# Patient Record
Sex: MALE | Race: BLACK OR AFRICAN AMERICAN | NOT HISPANIC OR LATINO | URBAN - METROPOLITAN AREA
[De-identification: names, ages, dates, MRNs, and addresses within clinical notes are randomized per-mention and may not be internally consistent; named-entity substitution may affect disease eponyms.]

---

## 2018-04-12 ENCOUNTER — OFFICE VISIT CONVERTED (OUTPATIENT)
Dept: SURGERY | Facility: CLINIC | Age: 23
End: 2018-04-12
Attending: SURGERY

## 2018-06-06 ENCOUNTER — CONVERSION ENCOUNTER (OUTPATIENT)
Dept: SURGERY | Facility: CLINIC | Age: 23
End: 2018-06-06

## 2018-06-06 ENCOUNTER — OFFICE VISIT CONVERTED (OUTPATIENT)
Dept: SURGERY | Facility: CLINIC | Age: 23
End: 2018-06-06
Attending: SURGERY

## 2018-06-21 ENCOUNTER — OFFICE VISIT CONVERTED (OUTPATIENT)
Dept: SURGERY | Facility: CLINIC | Age: 23
End: 2018-06-21
Attending: SURGERY

## 2018-06-21 ENCOUNTER — CONVERSION ENCOUNTER (OUTPATIENT)
Dept: SURGERY | Facility: CLINIC | Age: 23
End: 2018-06-21

## 2018-10-03 ENCOUNTER — OFFICE VISIT CONVERTED (OUTPATIENT)
Dept: SURGERY | Facility: CLINIC | Age: 23
End: 2018-10-03
Attending: SURGERY

## 2020-08-27 ENCOUNTER — OFFICE VISIT CONVERTED (OUTPATIENT)
Dept: NEUROLOGY | Facility: CLINIC | Age: 25
End: 2020-08-27
Attending: PSYCHIATRY & NEUROLOGY

## 2020-08-27 ENCOUNTER — CONVERSION ENCOUNTER (OUTPATIENT)
Dept: NEUROLOGY | Facility: CLINIC | Age: 25
End: 2020-08-27

## 2020-09-30 ENCOUNTER — HOSPITAL ENCOUNTER (OUTPATIENT)
Dept: NEUROLOGY | Facility: HOSPITAL | Age: 25
Discharge: HOME | End: 2020-09-30
Attending: PSYCHIATRY & NEUROLOGY

## 2020-10-16 ENCOUNTER — HOSPITAL ENCOUNTER (OUTPATIENT)
Dept: OTHER | Facility: HOSPITAL | Age: 25
Discharge: HOME | End: 2020-10-16
Attending: PSYCHIATRY & NEUROLOGY

## 2020-10-27 ENCOUNTER — OFFICE VISIT CONVERTED (OUTPATIENT)
Dept: NEUROLOGY | Facility: CLINIC | Age: 25
End: 2020-10-27
Attending: PSYCHIATRY & NEUROLOGY

## 2021-05-10 NOTE — H&P
History and Physical      Patient Name: Jonnathan Arevalo   Patient ID: [id number]   Sex: Male   YOB: 1995    Primary Care Provider: Nafisa Ryan MD   Referring Provider: Nafisa Ryan MD    Visit Date: 2020    Provider: Jamel Mendoza MD   Location: [facility]   Location Address: [address] [place] [address]  [id number]   Location Phone: [phone]          Chief Complaint     New pt here for seizure d/o.       History Of Present Illness  Jonnathan Arevalo is a 25 year old /Black male who presents today to [facility] today referred from Nafisa Ryan MD.      25-year-old man evaluated for seizures.  His father is with him today.  Patient has a learning disability.  He has been taking care of by his grandmother for his entire life until 3 years ago when his grandmother  and therefore his father and his uncle are taking care of him.  His mother sees him occasionally and she also has seizures.  The patient has been having seizures since he was 7 years old according to the father.  He has never been on any antiseizure medications in the past.  He seizures happens every day.  He stares in space every day and it can last for 1 to 2 minutes.  It can happen all day.  He had a spell in the last 2 weeks in which she would stare in space and then he would get stiff and his eyes would roll up and he does not fall down.  He gets tight for 5 minutes.  He gets confused with her 5 minutes thereafter.  According to father he has seen many of these spells and he starts staring and does not respond.  His father will usually try to nudge him to get his attention and finally he responds but gets confused if it does happen.    Father states that the patient needs to be coached for all activities of daily living or else he will not do it.  The need to tell him to brush his teeth, put the toothpaste in the toothbrush, change his clothes, dress and open  "with activities and supervise him all the time.  He is never left alone.  He went to high school in special ed.           Past Surgical History  Abdominal abscess drainage; Abdominal washout; Diagnostic laparoscopy         Allergy List  NO KNOWN DRUG ALLERGIES         Family Medical History  -Mother's Family History Unknown         Social History  Alcohol (Never); Caffeine (Current - status unknown); Second hand smoke exposure (Never); Tobacco (Never)         Review of Systems  · Constitutional  o Denies  o : chills, excessive sweating, fatigue, fever, sycope/passing out, weight gain, weight loss  · Eyes  o Denies  o : changes in vision, blurry vision, double vision  · HENT  o Admits  o : ear aches  o Denies  o : loss of hearing, ringing in the ears, sore throat, nasal congestion, sinus pain, nose bleeds, seasonal allergies  · Cardiovascular  o Denies  o : blood clots, swollen legs, anemia, easy burising or bleeding, transfusions  · Respiratory  o Denies  o : shortness of breath, dry cough, productive cough, pneumonia, COPD  · Gastrointestinal  o Denies  o : difficulty swallowing, reflux  · Genitourinary  o Denies  o : incontinence  · Neurologic  o Admits  o : headache, seizure  o Denies  o : stroke, tremor, loss of balance, falls, dizziness/vertigo, difficulty with sleep, numbness/tingling/paresthesia , difficulty with coordination, difficulty with dexterity, weakness  · Musculoskeletal  o Denies  o : neck stiffness/pain, swollen lymph nodes, muscle aches, joint pain, weakness, spasms, sciatica, pain radiating in arm, pain radiating in leg, low back pain  · Endocrine  o Denies  o : diabetes, thyroid disorder  · Psychiatric  o Denies  o : anxiety, depression      Vitals  Date Time BP Position Site L\R Cuff Size HR RR TEMP (F) WT  HT  BMI kg/m2 BSA m2 O2 Sat HC       08/27/2020 01:08 /72 Sitting    62 - R   294lbs 16oz 5'  11\" 41.14 2.59     08/27/2020 01:22 /72 Sitting    62 - R   294lbs 16oz 5'  11\" " 41.14 2.59           Physical Examination     He is alert, fluent, phasic, follows commands well.  Optic disks are normal bilaterally, EOMs full directions gaze, facial strength is full, soft palate elevation and tongue are normal.  There is no weakness of the upper or lower extremities individual muscle testing.  Fine finger movements are intact.  Reflexes are normoactive and symmetrical in the biceps, triceps, patellar's and ankles.  Cerebellar testing is intact.  On Station gait he is able to tiptoe, heel walk, alee and has slight difficulty with tandem.  Heart is regular rhythm normal in rate           Assessment  · Partial seizure with complex symptomatology     780.39/R56.9  I will start him on levetiracetam at 500 mg twice a day for 2 weeks and then 2 twice a day thereafter. Explained to them the adverse effects of the medication including drowsiness, and mood changes. Should have any problems they are to inform me. I will see him again in 2 months time for follow-up. I will do an MRI of the brain as well as EEG. I explained to the father that he has partial seizures. The differential is non-epileptic seizures however it is most likely partial seizures with complex symptomatology.    Minutes was spent for this low complexity encounter and more than half the time spent face-to-face the patient for examination, counseling, planning and recommendations.    Problems Reconciled  Plan  · Orders  o MRI brain wo then w contrast (53941) - 780.39/R56.9 - 2020   [facility]  o EEG-Awake and Asleep [facility] ([id number]) - 780.39/R56.9 - 2020  · Medications  o levetiracetam 500 mg oral tablet   SI tablet twice a day for 2 weeks then 2 tablets twice a day thereafter.   DISP: (120) tablets with 8 refills  Prescribed on 2020     o Medications have been Reconciled  o Transition of Care or Provider Policy  · Instructions  o Encouraged to follow-up with Primary Care Provider for preventative care.  o Follow up in  2 months.            Electronically Signed by: Jamel Mendoza MD -Author on August 27, 2020 01:48:37 PM

## 2021-05-13 NOTE — PROGRESS NOTES
Progress Note      Patient Name: Jonnathan Arevalo   Patient ID: [id number]   Sex: Male   YOB: 1995    Primary Care Provider: Nafisa Ryan MD   Referring Provider: Nafisa Ryan MD    Visit Date: October 27, 2020    Provider: Jamel Mendoza MD   Location: [facility]   Location Address: [address] [place] [address]  [id number]   Location Phone: [phone]          Chief Complaint     2 mo f/u seizures.       History Of Present Illness  Jonnathan Arevalo is a 25 year old /Black male who presents today to [facility] today referred from Nafisa Ryan MD.      25-year-old man here for follow-up of his seizures.  His uncle is with him.  He has not had any recurrent seizures since he is on levetiracetam 500 mg 2 tablets twice a day.  His uncle has noted him to have staring spells lasting for 5 minutes and is confused about 45 minutes in the past.  He has never had a generalized tonic-clonic seizure.  He had problems tolerating levetiracetam in the beginning but now is able to tolerate 40 mg 2 tablets twice a day.  Patient is not working.       Past Medical History  Seizure         Past Surgical History  Abdominal abscess drainage; Abdominal washout; Diagnostic laparoscopy         Medication List  cetirizine 10 mg oral tablet; levetiracetam 500 mg oral tablet; Omega DHA 92 mg (43 mg-22 [id number]) oral tablet,chewable; Prilosec oral         Allergy List  NO KNOWN DRUG ALLERGIES         Family Medical History  -Mother's Family History Unknown         Social History  Alcohol (Never); Caffeine (Current - status unknown); Second hand smoke exposure (Never); Tobacco (Never)         Review of Systems  · Constitutional  o Denies  o : chills, excessive sweating, fatigue, fever, sycope/passing out, weight gain, weight loss  · Eyes  o Denies  o : changes in vision, blurry vision, double vision  · HENT  o Denies  o : loss of hearing, ringing in  "the ears, ear aches, sore throat, nasal congestion, sinus pain, nose bleeds, seasonal allergies  · Cardiovascular  o Denies  o : blood clots, swollen legs, anemia, easy burising or bleeding, transfusions  · Respiratory  o Denies  o : shortness of breath, dry cough, productive cough, pneumonia, COPD  · Gastrointestinal  o Denies  o : difficulty swallowing, reflux  · Genitourinary  o Denies  o : incontinence  · Neurologic  o Admits  o : seizure  o Denies  o : headache, stroke, tremor, loss of balance, falls, dizziness/vertigo, difficulty with sleep, numbness/tingling/paresthesia , difficulty with coordination, difficulty with dexterity, weakness  · Musculoskeletal  o Denies  o : neck stiffness/pain, swollen lymph nodes, muscle aches, joint pain, weakness, spasms, sciatica, pain radiating in arm, pain radiating in leg, low back pain  · Endocrine  o Denies  o : diabetes, thyroid disorder  · Psychiatric  o Denies  o : anxiety, depression      Vitals  Date Time BP Position Site L\R Cuff Size HR RR TEMP (F) WT  HT  BMI kg/m2 BSA m2 O2 Sat FR L/min FiO2 HC       10/27/2020 03:48 /79 Sitting    70 - R   299lbs 0oz 5'  11\" 41.7 2.61             Physical Examination     Is alert, fluent, phasic and follows commands well.  Station and gait is unremarkable.  Heart is regular rhythm normal in rate.           Assessment  · Partial seizure with complex symptomatology     780.39/R56.9  He is to continue taking levetiracetam 500 mg 2 tablets twice a day. They are to call her office should he have recurrent seizures and I will add another antiepileptic medication to his regimen. I will see him again in 5 months time for follow-up.    15 minutes was spent for this low complexity visit more than half the time was spent face-to-face with the patient for examination, counseling, planning and recommendations.      Plan  · Medications  o Medications have been Reconciled  o Transition of Care or Provider " Policy  · Instructions  o Encouraged to follow-up with Primary Care Provider for preventative care.            Electronically Signed by: Jamel Mendoza MD -Author on October 27, 2020 04:02:42 PM

## 2021-05-14 VITALS
BODY MASS INDEX: 41.3 KG/M2 | SYSTOLIC BLOOD PRESSURE: 131 MMHG | DIASTOLIC BLOOD PRESSURE: 72 MMHG | HEART RATE: 62 BPM | WEIGHT: 295 LBS | HEIGHT: 71 IN

## 2021-05-14 VITALS
BODY MASS INDEX: 41.86 KG/M2 | HEART RATE: 70 BPM | HEIGHT: 71 IN | SYSTOLIC BLOOD PRESSURE: 132 MMHG | WEIGHT: 299 LBS | DIASTOLIC BLOOD PRESSURE: 79 MMHG

## 2021-05-16 VITALS
BODY MASS INDEX: 40.6 KG/M2 | RESPIRATION RATE: 16 BRPM | HEIGHT: 71 IN | WEIGHT: 290 LBS | BODY MASS INDEX: 40.6 KG/M2 | WEIGHT: 290 LBS | RESPIRATION RATE: 16 BRPM | HEIGHT: 71 IN

## 2021-05-16 VITALS — BODY MASS INDEX: 40.6 KG/M2 | WEIGHT: 290 LBS | RESPIRATION RATE: 18 BRPM | HEIGHT: 71 IN

## 2021-05-16 VITALS — BODY MASS INDEX: 40.6 KG/M2 | RESPIRATION RATE: 16 BRPM | WEIGHT: 290 LBS | HEIGHT: 71 IN

## 2021-05-16 VITALS — RESPIRATION RATE: 20 BRPM | WEIGHT: 289.31 LBS | HEIGHT: 71 IN | BODY MASS INDEX: 40.5 KG/M2

## 2021-08-09 ENCOUNTER — OFFICE VISIT (OUTPATIENT)
Dept: NEUROLOGY | Facility: CLINIC | Age: 26
End: 2021-08-09

## 2021-08-09 VITALS
DIASTOLIC BLOOD PRESSURE: 77 MMHG | SYSTOLIC BLOOD PRESSURE: 135 MMHG | HEIGHT: 70 IN | HEART RATE: 66 BPM | BODY MASS INDEX: 42.52 KG/M2 | WEIGHT: 297 LBS

## 2021-08-09 DIAGNOSIS — G40.109 LOCALIZATION-RELATED SYMPTOMATIC EPILEPSY AND EPILEPTIC SYNDROMES WITH SIMPLE PARTIAL SEIZURES, NOT INTRACTABLE, WITHOUT STATUS EPILEPTICUS (HCC): Primary | ICD-10-CM

## 2021-08-09 PROBLEM — R56.9 SEIZURE: Status: ACTIVE | Noted: 2021-08-09

## 2021-08-09 PROCEDURE — 99213 OFFICE O/P EST LOW 20 MIN: CPT | Performed by: PSYCHIATRY & NEUROLOGY

## 2021-08-09 RX ORDER — FAMOTIDINE 20 MG/1
20 TABLET, FILM COATED ORAL 2 TIMES DAILY
COMMUNITY
Start: 2021-07-27

## 2021-08-09 RX ORDER — OMEGA-3/DHA/EPA/FISH OIL 500-1000MG
CAPSULE ORAL
COMMUNITY

## 2021-08-09 RX ORDER — LEVETIRACETAM 500 MG/1
1000 TABLET ORAL 2 TIMES DAILY
Qty: 360 TABLET | Refills: 4 | Status: SHIPPED | OUTPATIENT
Start: 2021-08-09 | End: 2021-09-28 | Stop reason: SDUPTHER

## 2021-08-09 RX ORDER — LEVETIRACETAM 500 MG/1
1000 TABLET ORAL 2 TIMES DAILY
Qty: 360 TABLET | Refills: 4 | Status: SHIPPED | OUTPATIENT
Start: 2021-08-09 | End: 2021-08-09 | Stop reason: SDUPTHER

## 2021-08-09 RX ORDER — CETIRIZINE HYDROCHLORIDE 10 MG/1
TABLET ORAL
COMMUNITY

## 2021-08-09 RX ORDER — LEVETIRACETAM 500 MG/1
2 TABLET ORAL 2 TIMES DAILY
COMMUNITY
Start: 2021-07-01 | End: 2021-08-09 | Stop reason: SDUPTHER

## 2021-08-09 NOTE — PROGRESS NOTES
"Chief Complaint  Seizures    Subjective          Jonnathan Arevalo is a 26 y.o. male who presents to [facility]  History of Present Illness  26-year-old man here for follow-up of his seizures.  He is not having any seizures but has had 2 episodes and he felt like he was going to have a seizure and that was difficult for him to understand people and then it went away on its own.  He states that he has not had any staring spells and confusion.  His father is with him today.  He has not seen any spells.  He was supposed to take levetiracetam 500 mg 2 tablets twice a day however he is only taking it 2 tablets daily.  He has not had any recent laboratory work-up.    Objective   Vital Signs:   /77   Pulse 66   Ht 177.8 cm (70\")   Wt 135 kg (297 lb)   BMI 42.62 kg/m²     Physical Exam   He is alert, fluent, phasic, follows commands well.  Heart is regular rhythm normal in rate.  Station and gait is unremarkable.  He is able to tiptoe, heel walk, alee without difficulty.        Assessment and Plan  Diagnoses and all orders for this visit:    1. Localization-related symptomatic epilepsy and epileptic syndromes with simple partial seizures, not intractable, without status epilepticus (CMS/HCC) (Primary)  -     Comprehensive Metabolic Panel; Future  -     CBC & Differential; Future  -     Levetiracetam Level (Keppra); Future    Other orders  -     Discontinue: levETIRAcetam (KEPPRA) 500 MG tablet; Take 2 tablets by mouth 2 (two) times a day.  Dispense: 360 tablet; Refill: 4  -     levETIRAcetam (KEPPRA) 500 MG tablet; Take 2 tablets by mouth 2 (two) times a day.  Dispense: 360 tablet; Refill: 4         Total time spent with the patient and coordinating patient care was 20 minutes.    Follow Up  No follow-ups on file.  Patient was given instructions and counseling regarding his condition or for health maintenance advice. Please see specific information pulled into the AVS if " appropriate.

## 2021-08-16 ENCOUNTER — LAB (OUTPATIENT)
Dept: LAB | Facility: HOSPITAL | Age: 26
End: 2021-08-16

## 2021-08-16 DIAGNOSIS — G40.109 LOCALIZATION-RELATED SYMPTOMATIC EPILEPSY AND EPILEPTIC SYNDROMES WITH SIMPLE PARTIAL SEIZURES, NOT INTRACTABLE, WITHOUT STATUS EPILEPTICUS (HCC): ICD-10-CM

## 2021-08-16 LAB
ALBUMIN SERPL-MCNC: 4.3 G/DL (ref 3.5–5.2)
ALBUMIN/GLOB SERPL: 1.4 G/DL
ALP SERPL-CCNC: 113 U/L (ref 39–117)
ALT SERPL W P-5'-P-CCNC: 48 U/L (ref 1–41)
ANION GAP SERPL CALCULATED.3IONS-SCNC: 6.8 MMOL/L (ref 5–15)
AST SERPL-CCNC: 25 U/L (ref 1–40)
BASOPHILS # BLD AUTO: 0.03 10*3/MM3 (ref 0–0.2)
BASOPHILS NFR BLD AUTO: 0.5 % (ref 0–1.5)
BILIRUB SERPL-MCNC: 0.4 MG/DL (ref 0–1.2)
BUN SERPL-MCNC: 7 MG/DL (ref 6–20)
BUN/CREAT SERPL: 6.4 (ref 7–25)
CALCIUM SPEC-SCNC: 9.6 MG/DL (ref 8.6–10.5)
CHLORIDE SERPL-SCNC: 104 MMOL/L (ref 98–107)
CO2 SERPL-SCNC: 28.2 MMOL/L (ref 22–29)
CREAT SERPL-MCNC: 1.1 MG/DL (ref 0.76–1.27)
DEPRECATED RDW RBC AUTO: 37.8 FL (ref 37–54)
EOSINOPHIL # BLD AUTO: 0.17 10*3/MM3 (ref 0–0.4)
EOSINOPHIL NFR BLD AUTO: 2.6 % (ref 0.3–6.2)
ERYTHROCYTE [DISTWIDTH] IN BLOOD BY AUTOMATED COUNT: 12.2 % (ref 12.3–15.4)
GFR SERPL CREATININE-BSD FRML MDRD: 100 ML/MIN/1.73
GLOBULIN UR ELPH-MCNC: 3.1 GM/DL
GLUCOSE SERPL-MCNC: 123 MG/DL (ref 65–99)
HCT VFR BLD AUTO: 39.8 % (ref 37.5–51)
HGB BLD-MCNC: 13.9 G/DL (ref 13–17.7)
IMM GRANULOCYTES # BLD AUTO: 0.02 10*3/MM3 (ref 0–0.05)
IMM GRANULOCYTES NFR BLD AUTO: 0.3 % (ref 0–0.5)
LYMPHOCYTES # BLD AUTO: 1.92 10*3/MM3 (ref 0.7–3.1)
LYMPHOCYTES NFR BLD AUTO: 29.8 % (ref 19.6–45.3)
MCH RBC QN AUTO: 29.4 PG (ref 26.6–33)
MCHC RBC AUTO-ENTMCNC: 34.9 G/DL (ref 31.5–35.7)
MCV RBC AUTO: 84.3 FL (ref 79–97)
MONOCYTES # BLD AUTO: 0.44 10*3/MM3 (ref 0.1–0.9)
MONOCYTES NFR BLD AUTO: 6.8 % (ref 5–12)
NEUTROPHILS NFR BLD AUTO: 3.87 10*3/MM3 (ref 1.7–7)
NEUTROPHILS NFR BLD AUTO: 60 % (ref 42.7–76)
PLATELET # BLD AUTO: 246 10*3/MM3 (ref 140–450)
PMV BLD AUTO: 9.3 FL (ref 6–12)
POTASSIUM SERPL-SCNC: 4.7 MMOL/L (ref 3.5–5.2)
PROT SERPL-MCNC: 7.4 G/DL (ref 6–8.5)
RBC # BLD AUTO: 4.72 10*6/MM3 (ref 4.14–5.8)
SODIUM SERPL-SCNC: 139 MMOL/L (ref 136–145)
WBC # BLD AUTO: 6.45 10*3/MM3 (ref 3.4–10.8)

## 2021-08-16 PROCEDURE — 80053 COMPREHEN METABOLIC PANEL: CPT

## 2021-08-16 PROCEDURE — 36415 COLL VENOUS BLD VENIPUNCTURE: CPT

## 2021-08-16 PROCEDURE — 85025 COMPLETE CBC W/AUTO DIFF WBC: CPT

## 2021-08-16 PROCEDURE — 80177 DRUG SCRN QUAN LEVETIRACETAM: CPT

## 2021-08-19 LAB — LEVETIRACETAM SERPL-MCNC: 3.2 UG/ML (ref 10–40)

## 2021-08-20 ENCOUNTER — TELEPHONE (OUTPATIENT)
Dept: NEUROLOGY | Facility: CLINIC | Age: 26
End: 2021-08-20

## 2021-09-28 RX ORDER — LEVETIRACETAM 500 MG/1
1000 TABLET ORAL 2 TIMES DAILY
Qty: 360 TABLET | Refills: 8 | Status: SHIPPED | OUTPATIENT
Start: 2021-09-28 | End: 2022-05-09 | Stop reason: SDUPTHER

## 2021-09-28 NOTE — TELEPHONE ENCOUNTER
I was able to reach the patient and he said he had not been taking it right consistently. I explained the potential risk for breakthrough seizure that could result in permanent brain injury or death and he said he understood. I sent in refills to last until his f/u and asked that he call with further questions or concerns and he said he would.

## 2022-05-09 ENCOUNTER — OFFICE VISIT (OUTPATIENT)
Dept: NEUROLOGY | Facility: CLINIC | Age: 27
End: 2022-05-09

## 2022-05-09 ENCOUNTER — TELEPHONE (OUTPATIENT)
Dept: NEUROLOGY | Facility: CLINIC | Age: 27
End: 2022-05-09

## 2022-05-09 VITALS
HEIGHT: 70 IN | BODY MASS INDEX: 40.66 KG/M2 | SYSTOLIC BLOOD PRESSURE: 129 MMHG | WEIGHT: 284 LBS | HEART RATE: 71 BPM | DIASTOLIC BLOOD PRESSURE: 79 MMHG

## 2022-05-09 DIAGNOSIS — R56.9 SEIZURE: Primary | ICD-10-CM

## 2022-05-09 PROCEDURE — 99213 OFFICE O/P EST LOW 20 MIN: CPT | Performed by: PSYCHIATRY & NEUROLOGY

## 2022-05-09 RX ORDER — LEVETIRACETAM 500 MG/1
TABLET ORAL
Qty: 360 TABLET | Refills: 3 | Status: SHIPPED | OUTPATIENT
Start: 2022-05-09 | End: 2022-05-10 | Stop reason: SDUPTHER

## 2022-05-09 NOTE — TELEPHONE ENCOUNTER
I called the pharmacy to discuss and they advised me that the Keppra needing a PA was due to a quantity issue and they would pay for Keppra 100mg bid, but not 500mg tabs take 2 tabs bid as prescribed. They also advised me that the pt had last picked up this medication on 09/21/2022 for 90 day supply and that was the first and only time that they had filled it for him. After speaking with Dr. Mendoza, the decision was made to send in the prescription for the Keppra 1000mg bid dose for a 1 year supply as was prescribed at his appointment yesterday on 05/09/2022.

## 2022-05-09 NOTE — TELEPHONE ENCOUNTER
Caller: [facility]    Relationship:     Best call back number: [phone]    What medications are you currently taking:   Current Outpatient Medications on File Prior to Visit   Medication Sig Dispense Refill   • cetirizine (zyrTEC) 10 MG tablet cetirizine 10 mg oral tablet take 1 tablet (10 mg) by oral route once daily   Active     • famotidine (PEPCID) 20 MG tablet Take 20 mg by mouth 2 (Two) Times a Day.     • levETIRAcetam (KEPPRA) 500 MG tablet Take 2 tablets twice a day. 360 tablet 3   • Omega-3 Fatty Acids (Omega 3 500) 500 MG capsule Omega DHA 92 mg (43 mg-22 [id number]) oral tablet,chewable 500 mg qd   Active     • [DISCONTINUED] levETIRAcetam (KEPPRA) 500 MG tablet Take 2 tablets by mouth 2 (two) times a day. 360 tablet 8     No current facility-administered medications on file prior to visit.          When did you start taking these medications: N/A    Which medication are you concerned about: LEVETIRACECHICHO    Who prescribed you this medication: DR. REZA    What are your concerns: NEEDS PA    PLEASE CONTACT PHARMACY WITH PA    THANK YOU    How long have you had these concerns: N/A

## 2022-05-09 NOTE — PROGRESS NOTES
"Chief Complaint  Seizures    Subjective          Jonnathan Arevalo is a 26 y.o. male who presents to [facility]  History of Present Illness  26-year-old man here for follow-up with his seizures.  His uncle is with him today.  Patient has had partial seizures in which she stares.  EEG was abnormal showing frequent left frontal epileptiform discharges.  MRI of the brain is unremarkable.  Is taking levetiracetam 500 mg 2 tablets twice a day.  He has not had any recurrent staring spells.    Objective   Vital Signs:   /79 (BP Location: Right arm, Patient Position: Sitting, Cuff Size: Adult)   Pulse 71   Ht 177.8 cm (70\")   Wt 129 kg (284 lb)   BMI 40.75 kg/m²     Physical Exam   Alert, fluent, phasic, follows commands well.  Heart is regular rhythm normal in rate.  Station gait is unremarkable.        Assessment and Plan  Diagnoses and all orders for this visit:    1. Seizure (HCC) (Primary)  Assessment & Plan:  He has focal seizure with alteration of consciousness.  He is again taking levetiracetam 500 mg 2 tablets twice a day.  He is to follow-up with his primary care provider to prescribe medications in the future.  Should he have frequent staring spells and recurrent seizures in spite of being compliant with medications they are to make a follow-up visit to see me and I will start him on Vimpat.         Total time spent with the patient and coordinating patient care was 25 minutes.    Follow Up  No follow-ups on file.  Patient was given instructions and counseling regarding his condition or for health maintenance advice. Please see specific information pulled into the AVS if appropriate.       "

## 2022-05-09 NOTE — ASSESSMENT & PLAN NOTE
He has focal seizure with alteration of consciousness.  He is again taking levetiracetam 500 mg 2 tablets twice a day.  He is to follow-up with his primary care provider to prescribe medications in the future.  Should he have frequent staring spells and recurrent seizures in spite of being compliant with medications they are to make a follow-up visit to see me and I will start him on Vimpat.

## 2022-05-10 RX ORDER — LEVETIRACETAM 1000 MG/1
1000 TABLET ORAL 2 TIMES DAILY
Qty: 180 TABLET | Refills: 3 | Status: SHIPPED | OUTPATIENT
Start: 2022-05-10

## 2022-05-10 NOTE — TELEPHONE ENCOUNTER
I spoke to the pt and notified him of the Keppra 1000mg bid prescription and he voiced understanding and had no further questions at this time and will call with further questions or concerns,.

## 2023-06-12 RX ORDER — LEVETIRACETAM 1000 MG/1
1000 TABLET ORAL 2 TIMES DAILY
Qty: 180 TABLET | Refills: 3 | OUTPATIENT
Start: 2023-06-12

## 2023-08-23 ENCOUNTER — OFFICE VISIT (OUTPATIENT)
Dept: NEUROLOGY | Facility: CLINIC | Age: 28
End: 2023-08-23
Payer: COMMERCIAL

## 2023-08-23 VITALS
BODY MASS INDEX: 42.66 KG/M2 | SYSTOLIC BLOOD PRESSURE: 139 MMHG | DIASTOLIC BLOOD PRESSURE: 85 MMHG | WEIGHT: 298 LBS | HEIGHT: 70 IN | HEART RATE: 83 BPM

## 2023-08-23 DIAGNOSIS — G40.109 LOCALIZATION-RELATED SYMPTOMATIC EPILEPSY AND EPILEPTIC SYNDROMES WITH SIMPLE PARTIAL SEIZURES, NOT INTRACTABLE, WITHOUT STATUS EPILEPTICUS: ICD-10-CM

## 2023-08-23 DIAGNOSIS — R56.9 SEIZURE: Primary | ICD-10-CM

## 2023-08-23 PROCEDURE — 1159F MED LIST DOCD IN RCRD: CPT | Performed by: PSYCHIATRY & NEUROLOGY

## 2023-08-23 PROCEDURE — 99213 OFFICE O/P EST LOW 20 MIN: CPT | Performed by: PSYCHIATRY & NEUROLOGY

## 2023-08-23 PROCEDURE — 1160F RVW MEDS BY RX/DR IN RCRD: CPT | Performed by: PSYCHIATRY & NEUROLOGY

## 2023-08-23 RX ORDER — LEVETIRACETAM 1000 MG/1
1000 TABLET ORAL 2 TIMES DAILY
Qty: 180 TABLET | Refills: 4 | Status: SHIPPED | OUTPATIENT
Start: 2023-08-23

## 2023-08-23 NOTE — PROGRESS NOTES
"Chief Complaint  Seizures (PCP request our office to continue to follow for medication refills, patient has been out of medication for 2 days.)    Subjective          Jonnathan Arevalo is a 28 y.o. male who presents to [facility]  History of Present Illness  28-year-old man follow-up for seizures.  Has not had any seizures in the last 2 days in which she had staring spells at least 2 or 3 times.  His uncle is with him.  He lives with his uncle.  He stares in space.  His uncle states that the when he is home in the past is that he was in a store waiting and lying and he did not move and when he realized that he was standing in line his arms were down.  His uncle states that he just stares in space for a few seconds.  He is taking levetiracetam 1000 mg twice a day.  I had him follow-up with his primary care however they tell me that the primary care wanted him to follow-up with me for his medications.  He has not had recent laboratory work-up.    He lives with his uncle.  He is not driving.  He is independent with activities of daily living.  He does not work with machinery.  He is taking care of his uncle who has a handicap and is unable to walk but is able to drive using hand controls as well as cane to drive.  Objective   Vital Signs:   /85   Pulse 83   Ht 177.8 cm (70\")   Wt 135 kg (298 lb)   BMI 42.76 kg/mý     Physical Exam   Alert, fluent, phasic, follows commands well.  Optic disc are normal bilaterally there is nystagmus noted that is lateral.  There is no focal weakness.  Heart is regular rhythm normal in rate.        Assessment and Plan  Diagnoses and all orders for this visit:    1. Seizure (Primary)    2. Localization-related symptomatic epilepsy and epileptic syndromes with simple partial seizures, not intractable, without status epilepticus  Assessment & Plan:  He is to continue taking levetiracetam at 1000 mg twice a day.  I will obtain laboratory " work-up and I will inform him regarding the results.  I will see him again in 1 years time for follow-up.    Orders:  -     CBC & Differential; Future  -     Comprehensive Metabolic Panel; Future  -     Lipid Panel; Future  -     Hemoglobin A1c; Future  -     TSH; Future    Other orders  -     levETIRAcetam (KEPPRA) 1000 MG tablet; Take 1 tablet by mouth 2 (Two) Times a Day.  Dispense: 180 tablet; Refill: 4         Total time spent with the patient and coordinating patient care was 25 minutes.    Follow Up  No follow-ups on file.  Patient was given instructions and counseling regarding his condition or for health maintenance advice. Please see specific information pulled into the AVS if appropriate.

## 2023-08-23 NOTE — ASSESSMENT & PLAN NOTE
He is to continue taking levetiracetam at 1000 mg twice a day.  I will obtain laboratory work-up and I will inform him regarding the results.  I will see him again in 1 years time for follow-up.

## 2024-02-08 ENCOUNTER — TELEPHONE (OUTPATIENT)
Dept: NEUROLOGY | Facility: CLINIC | Age: 29
End: 2024-02-08
Payer: COMMERCIAL

## 2024-02-08 RX ORDER — LEVETIRACETAM 750 MG/1
1500 TABLET ORAL 2 TIMES DAILY
Qty: 120 TABLET | Refills: 5 | Status: SHIPPED | OUTPATIENT
Start: 2024-02-08 | End: 2024-03-09

## 2024-02-08 NOTE — TELEPHONE ENCOUNTER
"Spoke with pt on the phone states he takes his Keppra once in the am and once in the pm. States he has not missed any doses. States before starting medication it would take him about 10 minutes to \"come out of it\" but now takes 2-3 minutes. States just stares during episode. States happens about 3 times per day and this has been occurring for the past couple of weeks. States last episode was about 2 hours ago.   "

## 2024-02-08 NOTE — TELEPHONE ENCOUNTER
Provider: DR REZA    Caller: PATIENT    Relationship to Patient: SELF    Pharmacy: [facility]    Phone Number: [phone]    Reason for Call: PATIENT STATES HE FEELS LIKE THE MEDICATION IS NOT WORKING AS WELL AS IT SHOULD BE. HE IS STILL HAVING SEIZURES DAILY BUT STATES IT IS EASIER THAN BEFORE TO SNAP OUT OF THEM. PLEASE REVIEW & ADVISE, THANK YOU.

## 2024-02-08 NOTE — TELEPHONE ENCOUNTER
Spoke with pt on the phone stated he does not drive but someone can  medicine for him. Let him know Sonya sent in new prescription for him to take as directed. Increased to 1500mg BID. Pt verbalized understanding.

## 2024-02-08 NOTE — TELEPHONE ENCOUNTER
Increase Keppra to 1500mg BID. I have sent into pharmacy.  He is not cleared to drive until 90 days seizure free.  He should pursue sooner f/u with [facility]

## 2024-02-16 ENCOUNTER — LAB (OUTPATIENT)
Dept: LAB | Facility: HOSPITAL | Age: 29
End: 2024-02-16
Payer: COMMERCIAL

## 2024-02-16 DIAGNOSIS — G40.109 LOCALIZATION-RELATED SYMPTOMATIC EPILEPSY AND EPILEPTIC SYNDROMES WITH SIMPLE PARTIAL SEIZURES, NOT INTRACTABLE, WITHOUT STATUS EPILEPTICUS: ICD-10-CM

## 2024-02-16 LAB
ALBUMIN SERPL-MCNC: 4.4 G/DL (ref 3.5–5.2)
ALBUMIN/GLOB SERPL: 1.7 G/DL
ALP SERPL-CCNC: 115 U/L (ref 39–117)
ALT SERPL W P-5'-P-CCNC: 45 U/L (ref 1–41)
ANION GAP SERPL CALCULATED.3IONS-SCNC: 10.6 MMOL/L (ref 5–15)
AST SERPL-CCNC: 32 U/L (ref 1–40)
BASOPHILS # BLD AUTO: 0.03 10*3/MM3 (ref 0–0.2)
BASOPHILS NFR BLD AUTO: 0.5 % (ref 0–1.5)
BILIRUB SERPL-MCNC: 0.3 MG/DL (ref 0–1.2)
BUN SERPL-MCNC: 12 MG/DL (ref 6–20)
BUN/CREAT SERPL: 11.8 (ref 7–25)
CALCIUM SPEC-SCNC: 9.1 MG/DL (ref 8.6–10.5)
CHLORIDE SERPL-SCNC: 105 MMOL/L (ref 98–107)
CHOLEST SERPL-MCNC: 129 MG/DL (ref 0–200)
CO2 SERPL-SCNC: 25.4 MMOL/L (ref 22–29)
CREAT SERPL-MCNC: 1.02 MG/DL (ref 0.76–1.27)
DEPRECATED RDW RBC AUTO: 40.1 FL (ref 37–54)
EGFRCR SERPLBLD CKD-EPI 2021: 104 ML/MIN/1.73
EOSINOPHIL # BLD AUTO: 0.14 10*3/MM3 (ref 0–0.4)
EOSINOPHIL NFR BLD AUTO: 2.2 % (ref 0.3–6.2)
ERYTHROCYTE [DISTWIDTH] IN BLOOD BY AUTOMATED COUNT: 12.5 % (ref 12.3–15.4)
GLOBULIN UR ELPH-MCNC: 2.6 GM/DL
GLUCOSE SERPL-MCNC: 132 MG/DL (ref 65–99)
HBA1C MFR BLD: 5.9 % (ref 4.8–5.6)
HCT VFR BLD AUTO: 43.6 % (ref 37.5–51)
HDLC SERPL-MCNC: 46 MG/DL (ref 40–60)
HGB BLD-MCNC: 14.6 G/DL (ref 13–17.7)
IMM GRANULOCYTES # BLD AUTO: 0.03 10*3/MM3 (ref 0–0.05)
IMM GRANULOCYTES NFR BLD AUTO: 0.5 % (ref 0–0.5)
LDLC SERPL CALC-MCNC: 67 MG/DL (ref 0–100)
LDLC/HDLC SERPL: 1.46 {RATIO}
LYMPHOCYTES # BLD AUTO: 1.69 10*3/MM3 (ref 0.7–3.1)
LYMPHOCYTES NFR BLD AUTO: 26.8 % (ref 19.6–45.3)
MCH RBC QN AUTO: 29.5 PG (ref 26.6–33)
MCHC RBC AUTO-ENTMCNC: 33.5 G/DL (ref 31.5–35.7)
MCV RBC AUTO: 88.1 FL (ref 79–97)
MONOCYTES # BLD AUTO: 0.44 10*3/MM3 (ref 0.1–0.9)
MONOCYTES NFR BLD AUTO: 7 % (ref 5–12)
NEUTROPHILS NFR BLD AUTO: 3.97 10*3/MM3 (ref 1.7–7)
NEUTROPHILS NFR BLD AUTO: 63 % (ref 42.7–76)
PLATELET # BLD AUTO: 201 10*3/MM3 (ref 140–450)
PMV BLD AUTO: 10.9 FL (ref 6–12)
POTASSIUM SERPL-SCNC: 4.6 MMOL/L (ref 3.5–5.2)
PROT SERPL-MCNC: 7 G/DL (ref 6–8.5)
RBC # BLD AUTO: 4.95 10*6/MM3 (ref 4.14–5.8)
SODIUM SERPL-SCNC: 141 MMOL/L (ref 136–145)
TRIGL SERPL-MCNC: 80 MG/DL (ref 0–150)
TSH SERPL DL<=0.05 MIU/L-ACNC: 0.89 UIU/ML (ref 0.27–4.2)
VLDLC SERPL-MCNC: 16 MG/DL (ref 5–40)
WBC NRBC COR # BLD AUTO: 6.3 10*3/MM3 (ref 3.4–10.8)

## 2024-02-16 PROCEDURE — 83036 HEMOGLOBIN GLYCOSYLATED A1C: CPT

## 2024-02-16 PROCEDURE — 80061 LIPID PANEL: CPT

## 2024-02-16 PROCEDURE — 80053 COMPREHEN METABOLIC PANEL: CPT

## 2024-02-16 PROCEDURE — 84443 ASSAY THYROID STIM HORMONE: CPT

## 2024-02-16 PROCEDURE — 85025 COMPLETE CBC W/AUTO DIFF WBC: CPT

## 2024-02-16 PROCEDURE — 36415 COLL VENOUS BLD VENIPUNCTURE: CPT

## 2024-08-22 ENCOUNTER — OFFICE VISIT (OUTPATIENT)
Dept: NEUROLOGY | Facility: CLINIC | Age: 29
End: 2024-08-22
Payer: COMMERCIAL

## 2024-08-22 ENCOUNTER — TELEPHONE (OUTPATIENT)
Dept: NEUROLOGY | Facility: CLINIC | Age: 29
End: 2024-08-22

## 2024-08-22 VITALS
BODY MASS INDEX: 39.65 KG/M2 | SYSTOLIC BLOOD PRESSURE: 139 MMHG | WEIGHT: 277 LBS | DIASTOLIC BLOOD PRESSURE: 83 MMHG | HEART RATE: 69 BPM | HEIGHT: 70 IN

## 2024-08-22 DIAGNOSIS — G40.109 LOCALIZATION-RELATED SYMPTOMATIC EPILEPSY AND EPILEPTIC SYNDROMES WITH SIMPLE PARTIAL SEIZURES, NOT INTRACTABLE, WITHOUT STATUS EPILEPTICUS: Primary | ICD-10-CM

## 2024-08-22 PROCEDURE — 1160F RVW MEDS BY RX/DR IN RCRD: CPT | Performed by: PSYCHIATRY & NEUROLOGY

## 2024-08-22 PROCEDURE — 1159F MED LIST DOCD IN RCRD: CPT | Performed by: PSYCHIATRY & NEUROLOGY

## 2024-08-22 PROCEDURE — 99213 OFFICE O/P EST LOW 20 MIN: CPT | Performed by: PSYCHIATRY & NEUROLOGY

## 2024-08-22 RX ORDER — LACOSAMIDE 150 MG/1
TABLET ORAL
Qty: 60 TABLET | Refills: 5 | Status: SHIPPED | OUTPATIENT
Start: 2024-08-22

## 2024-08-22 RX ORDER — LACOSAMIDE 50 MG/1
TABLET ORAL
Qty: 14 TABLET | Refills: 0 | Status: SHIPPED | OUTPATIENT
Start: 2024-08-22

## 2024-08-22 RX ORDER — LACOSAMIDE 100 MG/1
TABLET ORAL
Qty: 14 TABLET | Refills: 0 | Status: SHIPPED | OUTPATIENT
Start: 2024-08-22

## 2024-08-22 NOTE — PROGRESS NOTES
"Chief Complaint  Med Refill (Keppra) and Follow-up (Reports he is still having frequent seizures. )    Subjective          Jonnathan Arevalo is a 29 y.o. male who presents to [facility]  History of Present Illness  29-year-old man here for follow-up of his seizures.  He states that he is having staring spells at least 2 or 3 times a month lasting for 2 seconds at a time which is a lot better than before which could last a few seconds to a minute or longer he realizes that he is out of it.  Is taking Keppra 750 mg 2 tablets twice a day.  He has no history of heart disease.    Objective   Vital Signs:   /83   Pulse 69   Ht 177.8 cm (70\")   Wt 126 kg (277 lb)   BMI 39.75 kg/m²     Physical Exam   Alert, fluent, phasic, follows commands well.  Heart is regular rhythm normal in rate        Assessment and Plan  Diagnoses and all orders for this visit:    1. Localization-related symptomatic epilepsy and epileptic syndromes with simple partial seizures, not intractable, without status epilepticus (Primary)  Assessment & Plan:  I gave him a titration schedule of Vimpat which she is to take 50 mg twice a day for 1 week, 100 mg twice a day the second week, 150 mg twice a day the third week and on.  He is to continue taking Keppra 750 mg 2 tablets twice a day.  I explained to him that if his effects of Vimpat and he signed a controlled substance agreement and I will see him again in 2 months time for follow-up.  Thank you for let me participate in his care.    Orders:  -     lacosamide (VIMPAT) 50 MG tablet tablet; 1 tablet twice a day first week  Dispense: 14 tablet; Refill: 0  -     lacosamide (VIMPAT) 100 MG tablet tablet; 1 tablet twice a day second week  Dispense: 14 tablet; Refill: 0  -     lacosamide 150 MG tablet; 1 tablet twice a day starting on the third week and on.  Dispense: 60 tablet; Refill: 5         Total time spent with the patient and coordinating patient care " was 20 minutes.    Follow Up  No follow-ups on file.  Patient was given instructions and counseling regarding his condition or for health maintenance advice. Please see specific information pulled into the AVS if appropriate.

## 2024-08-22 NOTE — ASSESSMENT & PLAN NOTE
I gave him a titration schedule of Vimpat which she is to take 50 mg twice a day for 1 week, 100 mg twice a day the second week, 150 mg twice a day the third week and on.  He is to continue taking Keppra 750 mg 2 tablets twice a day.  I explained to him that if his effects of Vimpat and he signed a controlled substance agreement and I will see him again in 2 months time for follow-up.  Thank you for let me participate in his care.

## 2024-08-22 NOTE — TELEPHONE ENCOUNTER
Provider: LIBIA    Caller: [facility]    Pharmacy: [facility]    Phone Number: [phone]    Reason for Call: ELIAZAR WITH [facility] CALLED AND STATES THAT HEY RECEIVED 3 PRESCRIPTIONS FOR LACOSAMIDE WITH DIFFERENT MG'S. PHARMACY IS WANTING TO KNOW IF A NEW PRESCRIPTION FOR THE 50MG CAN BE SENT OVER FOR 1 TABLET 2 TIMES A DAY FOR 7 DAYS, THEN 2 TABLETS 2 TIMES A DAY FOR 7 DAYS AND SO ON UNTIL PT IS AT 150MG. PHARMACY IS WANTING TO CANCEL OUT THE 100MG OF MEDICATION AND GO TO THE 15OMG TAPERING UP WITH THE 50 MG OF MEDICATION.    PLEASE REVIEW AND ADVISE  THANK YOU

## 2024-08-23 NOTE — TELEPHONE ENCOUNTER
ELIAZAR FROM [facility] CALLED AND WAS SUCCESSFULLY WARM TRANSFERRED TO [facility]    DOC PER [facility]UB PROTOCOL

## 2024-08-23 NOTE — TELEPHONE ENCOUNTER
Spoke with Gisselle and relayed the message regarding the medication dose and quantity, but she would till like to speak with Spike to ask additional questions.

## 2024-08-23 NOTE — TELEPHONE ENCOUNTER
Pharmacy will need to be called once they open, [employer] does not typically pay for that quantity & medication is prescribed in that way for best practice so dosing is not administered incorrectly by patient.

## 2024-08-26 RX ORDER — LEVETIRACETAM 750 MG/1
1500 TABLET ORAL 2 TIMES DAILY
Qty: 120 TABLET | Refills: 4 | Status: SHIPPED | OUTPATIENT
Start: 2024-08-26 | End: 2024-09-25

## 2024-08-26 NOTE — TELEPHONE ENCOUNTER
I have called twice, second time today, and have been on hold over 10 minutes. Our nurse has also called with lengthy wait times. I called patient & he stated he was able to  Vimpat with no problems. Asked to call back if he has any issues.

## 2024-10-11 ENCOUNTER — TELEPHONE (OUTPATIENT)
Dept: NEUROLOGY | Facility: CLINIC | Age: 29
End: 2024-10-11
Payer: COMMERCIAL

## 2024-10-11 DIAGNOSIS — G40.109 LOCALIZATION-RELATED SYMPTOMATIC EPILEPSY AND EPILEPTIC SYNDROMES WITH SIMPLE PARTIAL SEIZURES, NOT INTRACTABLE, WITHOUT STATUS EPILEPTICUS: ICD-10-CM

## 2024-10-11 RX ORDER — LACOSAMIDE 100 MG/1
TABLET ORAL
Qty: 60 TABLET | Refills: 3 | Status: SHIPPED | OUTPATIENT
Start: 2024-10-11

## 2024-10-11 NOTE — TELEPHONE ENCOUNTER
MEDICATION CONCERNS    Caller: Jonnathan Arevalo SIL    Relationship: Self    Best call back number: [phone]    What medications are you currently taking:   Current Outpatient Medications on File Prior to Visit   Medication Sig Dispense Refill    cetirizine (zyrTEC) 10 MG tablet cetirizine 10 mg oral tablet take 1 tablet (10 mg) by oral route once daily   Active      famotidine (PEPCID) 20 MG tablet Take 1 tablet by mouth 2 (Two) Times a Day.      lacosamide (VIMPAT) 100 MG tablet tablet 1 tablet twice a day second week 14 tablet 0    lacosamide (VIMPAT) 50 MG tablet tablet 1 tablet twice a day first week 14 tablet 0    lacosamide 150 MG tablet 1 tablet twice a day starting on the third week and on. 60 tablet 5    levETIRAcetam (KEPPRA) 750 MG tablet TAKE 2 TABLETS BY MOUTH 2 (TWO) TIMES A DAY FOR 30 DAYS. 120 tablet 4    Omega-3 Fatty Acids (Omega 3 500) 500 MG capsule Omega DHA 92 mg (43 mg-22 [id number]) oral tablet,chewable 500 mg qd   Active       No current facility-administered medications on file prior to visit.     Which medication are you concerned about: acosamide (VIMPAT)    Who prescribed you this medication: DR. REZA    When did you start taking these medications: 8/27/2024 (THE DAY AFTER RX WAS SENT IN)    What are your concerns: PT STATES HE TOOK TWO DOSAGES OF THE VIMPAT WHEN RX WAS ORIGINALLY PRESCRIBED, WITH NO CONCERN. HOWEVER, WHEN HE TOOK THE THIRD DOSAGE, HE REPORTS HE BECAME VERY LIGHTHEADED AND DIZZY. HE STATES HE THEN DECIDED TO DISCONTINUE THE MEDICATION AND HAS NOT TAKEN IT SINCE.    HE DENIES ANY RECENT SEIZURES/SEIZURE-LIKE ACTIVITY. HE CONTINUES TO TAKE KEPPRA 750MG- TWO TABLETS TWICE DAILY.    ATTEMPTED TO WARM-TRANSFER CALL TO CLINICAL STAFF TO FURTHER DISCUSSION. NO ANSWER. PT IS AWARE TO REPORT TO THE NEAREST ED FOR FURTHER EVALUATION IF HE HAS ANY SEIZURES/SEIZURE-LIKE ACTIVITY WHILE AWAITING A CALL BACK. PT VERBALIZED UNDERSTANDING.    PLEASE REVIEW AND ADVISE.

## 2024-10-11 NOTE — TELEPHONE ENCOUNTER
"Spoke with patient, he stated that when he started the 150 mg is when the symptoms started- extreme dizziness, constant lightheadedness and nausea, reports he was taking medication with food. States he felt fine on the 100 mg, he stopped the vimpat \"3 or so\" days ago. Reports he is not currently having any seizures and is taking his keppra.  "

## 2024-10-11 NOTE — TELEPHONE ENCOUNTER
Called patient and let him know Sonya will be sending 100 mg Vimpat to his pharmacy this evening. Spoke with pharmacy and they will notify patient when script is filled.

## 2024-10-23 ENCOUNTER — OFFICE VISIT (OUTPATIENT)
Dept: NEUROLOGY | Facility: CLINIC | Age: 29
End: 2024-10-23
Payer: COMMERCIAL

## 2024-10-23 VITALS
HEART RATE: 68 BPM | WEIGHT: 272 LBS | SYSTOLIC BLOOD PRESSURE: 135 MMHG | BODY MASS INDEX: 38.94 KG/M2 | DIASTOLIC BLOOD PRESSURE: 74 MMHG | HEIGHT: 70 IN

## 2024-10-23 DIAGNOSIS — G40.109 LOCALIZATION-RELATED SYMPTOMATIC EPILEPSY AND EPILEPTIC SYNDROMES WITH SIMPLE PARTIAL SEIZURES, NOT INTRACTABLE, WITHOUT STATUS EPILEPTICUS: Primary | ICD-10-CM

## 2024-10-23 PROCEDURE — 1160F RVW MEDS BY RX/DR IN RCRD: CPT | Performed by: PSYCHIATRY & NEUROLOGY

## 2024-10-23 PROCEDURE — 1159F MED LIST DOCD IN RCRD: CPT | Performed by: PSYCHIATRY & NEUROLOGY

## 2024-10-23 PROCEDURE — 99213 OFFICE O/P EST LOW 20 MIN: CPT | Performed by: PSYCHIATRY & NEUROLOGY

## 2024-10-23 RX ORDER — LEVETIRACETAM 750 MG/1
TABLET ORAL
Qty: 360 TABLET | Refills: 3 | Status: SHIPPED | OUTPATIENT
Start: 2024-10-23

## 2024-10-23 NOTE — ASSESSMENT & PLAN NOTE
His grandfather wants him to continue taking Keppra and does not want him to start another medication such as Xcopri.  I discussed with them that should his seizures become intolerable that keeps on having staring spells I would recommend for him to be started on Xcopri.  I will see him again in 4 months time for follow-up.  Thank you

## 2024-10-23 NOTE — PROGRESS NOTES
"Chief Complaint  Follow-up (Unable to tolerate vimpat. )    Subjective          Jonnathan Arevalo is a 29 y.o. male who presents to [facility]  History of Present Illness  29-year-old man here for eval of his seizures.  His grandfather is with him.  His grandfather states that he has a seizure in which she stares for a few minutes every so often.  Tried to adjust the Vimpat and Keppra in which she stopped taking the Keppra and continue taking the Vimpat at lower doses and it still made him very dizzy.  He could not tolerate the Vimpat.  He is taking Keppra 750 mg 2 tablets twice a day.    Objective   Vital Signs:   /74 (BP Location: Right arm, Patient Position: Sitting, Cuff Size: Adult)   Pulse 68   Ht 177.8 cm (70\")   Wt 123 kg (272 lb)   BMI 39.03 kg/m²     Physical Exam   Alert, fluent, phasic, follows commands well.  Heart is regular rhythm normal in rate        Assessment and Plan  Diagnoses and all orders for this visit:    1. Localization-related symptomatic epilepsy and epileptic syndromes with simple partial seizures, not intractable, without status epilepticus (Primary)  Assessment & Plan:  His grandfather wants him to continue taking Keppra and does not want him to start another medication such as Xcopri.  I discussed with them that should his seizures become intolerable that keeps on having staring spells I would recommend for him to be started on Xcopri.  I will see him again in 4 months time for follow-up.  Thank you      Other orders  -     levETIRAcetam (KEPPRA) 750 MG tablet; Take 2 tablets twice a day  Dispense: 360 tablet; Refill: 3         Total time spent with the patient and coordinating patient care was 25 minutes.    Follow Up  No follow-ups on file.  Patient was given instructions and counseling regarding his condition or for health maintenance advice. Please see specific information pulled into the AVS if appropriate.       "

## 2025-03-24 ENCOUNTER — OFFICE VISIT (OUTPATIENT)
Dept: NEUROLOGY | Facility: CLINIC | Age: 30
End: 2025-03-24
Payer: COMMERCIAL

## 2025-03-24 VITALS
HEART RATE: 68 BPM | DIASTOLIC BLOOD PRESSURE: 72 MMHG | WEIGHT: 263 LBS | SYSTOLIC BLOOD PRESSURE: 114 MMHG | HEIGHT: 70 IN | BODY MASS INDEX: 37.65 KG/M2

## 2025-03-24 DIAGNOSIS — G40.109 LOCALIZATION-RELATED SYMPTOMATIC EPILEPSY AND EPILEPTIC SYNDROMES WITH SIMPLE PARTIAL SEIZURES, NOT INTRACTABLE, WITHOUT STATUS EPILEPTICUS: Primary | ICD-10-CM

## 2025-03-24 PROCEDURE — 99213 OFFICE O/P EST LOW 20 MIN: CPT | Performed by: PSYCHIATRY & NEUROLOGY

## 2025-03-24 RX ORDER — LEVETIRACETAM 750 MG/1
TABLET ORAL
Qty: 360 TABLET | Refills: 3 | Status: SHIPPED | OUTPATIENT
Start: 2025-03-24

## 2025-03-24 NOTE — PROGRESS NOTES
"Chief Complaint  Follow-up (Med check)    Subjective          Jonnathan Arevalo is a 29 y.o. male who presents to [facility]  History of Present Illness      History of Present Illness  The patient presents for evaluation of seizures.    He is currently on a regimen of Keppra, which has significantly reduced the frequency of his seizures. Prior to the initiation of this medication, he experienced seizures every few days. Currently, he reports experiencing 1 to 2 seizures annually. He does not experience any staring spells. His grandfather has previously expressed a preference for maintaining the current medication without introducing additional treatments. He had previously been on Vimpat but discontinued its use due to severe headaches.    SOCIAL HISTORY  He resides with his grandfather and his wife.  He is not working.    MEDICATIONS  Current: Keppra  Discontinued: Vimpat      Objective   Vital Signs:   /72 (BP Location: Right arm, Patient Position: Sitting, Cuff Size: Adult)   Pulse 68   Ht 177.8 cm (70\")   Wt 119 kg (263 lb)   BMI 37.74 kg/m²     Physical Exam   Right, fluent, phasic, follows commands well.  Heart is regular rhythm normal in rate.     Results    I reviewed the notes from previous visits any seizures are controlled that I had not referred him to [facility] epilepsy clinic.       Assessment and Plan  Diagnoses and all orders for this visit:    1. Localization-related symptomatic epilepsy and epileptic syndromes with simple partial seizures, not intractable, without status epilepticus (Primary)    Other orders  -     levETIRAcetam (KEPPRA) 750 MG tablet; Take 2 tablets twice a day  Dispense: 360 tablet; Refill: 3         Assessment & Plan  1. Seizures.  His seizures are currently well-managed with Keppra, taking 750 mg twice daily. He reports no recent seizures and no staring spells. He is advised to continue with the current medication " regimen. If there is any exacerbation in the frequency or intensity of his seizures, he should inform the clinic immediately for a potential follow-up consultation.    Follow-up  The patient will follow up in 1 year.      Total time spent with the patient and coordinating patient care was 25 minutes.    Follow Up  No follow-ups on file.  Patient was given instructions and counseling regarding his condition or for health maintenance advice. Please see specific information pulled into the AVS if appropriate.     Patient or patient representative verbalized consent for the use of Ambient Listening during the visit with  Jamel Mendoza MD for chart documentation. 3/24/2025  15:17 EDT